# Patient Record
(demographics unavailable — no encounter records)

---

## 2024-10-28 NOTE — ADDENDUM
[FreeTextEntry1] : Documented by Karl Gautam acting as a scribe for Dr. Carmona and Joshua Mireles PA-C on 10/28/2024 and was presence for the following sections: Physical Exam; Data Reviewed; Assessment; Discussion/Summary. All medical record entries made by the Scribe were at my, Dr. Carmona, and Joshua Mireles, direction and personally dictated by me on 10/28/2024. I have reviewed the chart and agree that the record accurately reflects my personal performance of the history, physical exam, procedure and imaging.

## 2024-10-28 NOTE — HISTORY OF PRESENT ILLNESS
[de-identified] : The patient is a 30 year old [RIGHT] hand dominant male who presents today complaining of right knee.   Date of Injury/Onset: 1 month ago  Pain:    At Rest: 0/10 With Activity:  5/10  Mechanism of injury: Pt reports hyperextending the kneel while playing beach volleyball  This is NOT a Work Related Injury being treated under Worker's Compensation. This is NOT an athletic injury occurring associated with an interscholastic or organized sports team. Quality of symptoms: sharp, stabbing, aching  Improves with: rest, ibuprofen, ice  Worse with: squatting Prior treatment: P/T 6  weeks ago. Admits it made symptoms worse. Soreness and lingering pain.  Prior Imaging: MRI 10/17/24 Out of work/sport: _, since _ School/Sport/Position/Occupation: Sales  Additional Information: None

## 2024-10-28 NOTE — PHYSICAL EXAM
[de-identified] : Neurologic: normal sensation, normal mood and affect, orientated and able to communicate  Right Knee: NO effusion, FULL ROM neurovascularly intact ligamentously stable Non-tender

## 2024-10-28 NOTE — DISCUSSION/SUMMARY
[de-identified] : Reviewed all MRI images with patient today and interpretation was provided. Patient advised to rest. Patient will follow up in 6 weeks.     ----------------------------------------------- Home Exercise The patient is instructed on a home exercise program.  WEN JACOME Acting as a Scribe for Dr. Kristine RACHEL, Wen Jacome, attest that this documentation has been prepared under the direction and in the presence of Provider Dr. Carmona.  Activity Modification The patient was advised to modify their activities.  Dx / Natural History The patient was advised of the diagnosis. The natural history of the pathology was explained in full to the patient in layman's terms. Several different treatment options were discussed and explained in full to the patient including the risks and benefits of both surgical and non-surgical treatments.  All questions and concerns were answered.  Pain Guide Activities The patient was advised to let pain guide the gradual advancement of activities.  RICE I explained to the patient that rest, ice, compression, and elevation would benefit them. They may return to activity after follow-up or when they no longer have any pain.  The patient's current medication management of their orthopedic diagnosis was reviewed today: (1) We discussed a comprehensive treatment plan that included possible pharmaceutical management involving the use of prescription strength medications including but not limited to options such as oral Naprosyn 500mg BID, once daily Meloxicam 15 mg, or 500-650 mg Tylenol versus over the counter oral medications and topical prescription NSAID Pennsaid vs over the counter Voltaren gel. (2) There is a moderate risk of morbidity with further treatment, especially from use of prescription strength medications and possible side effects of these medications which include upset stomach with oral medications, skin reactions to topical medications and cardiac/renal issues with long term use. (3) I recommended that the patient follow-up with their medical physician to discuss any significant specific potential issues with long term medication use such as interactions with current medications or with exacerbation of underlying medical comorbidities. (4) The benefits and risks associated with use of injectable, oral or topical, prescription and over the counter anti-inflammatory medications were discussed with the patient. The patient voiced understanding of the risks including but not limited to bleeding, stroke, kidney dysfunction, heart disease, and were referred to the black box warning label for further information.

## 2024-10-28 NOTE — DATA REVIEWED
[FreeTextEntry1] : 08/26/24 X-Ray Examination of the RIGHT KNEE: 4 views: Patella Stacey, otherwise unremarkable  10/17/24 [facility] Right Knee: This scan was reviewed and interpreted by Dr. Carmona, and his findings are- Impression:  1. Possible diffuse stress reaction or contusion throughout the patella and findings consistent with mild partial tearing and delamination at the central quadriceps tendon insertion with mild surrounding bursitis without discontinuity or retraction. 2. Mild patella tendinopathy. 3. Slight effusion. 4. Mild subcortical cysts in the posterior superior lateral femoral condyle which appear degenerative or inflammatory and may be related to enthesopathic changes of the proximal lateral gastrocnemius tendon insertion. 5. No meniscal tear. chondral defect in the patella or loose body.

## 2024-12-10 NOTE — DISCUSSION/SUMMARY
[de-identified] : Patient is still experiencing pain. Discussed all treatment options with patient, patient is considering PRP injections for both knees Physical therapy prescribed for strengthening and stretching.--- bilateral distal quad tear MRI of the left knee to eval for distal quad tear. Follow up for prp injection -right knee.  PT at [facility]    ----------------------------------------------- Home Exercise The patient is instructed on a home exercise program.  NAMRATA TSE Acting as a Scribe for Dr. Kristine RACHEL, Namrata Tse, attest that this documentation has been prepared under the direction and in the presence of Provider David Carmona MD.  Activity Modification The patient was advised to modify their activities.  Dx / Natural History The patient was advised of the diagnosis.  The natural history of the pathology was explained in full to the patient in layman's terms.  Several different treatment options were discussed and explained in full to the patient including the risks and benefits of both surgical and non-surgical treatments.  All questions and concerns were answered.  Pain Guide Activities The patient was advised to let pain guide the gradual advancement of activities.  BERTA RACHEL explained to the patient that rest, ice, compression, and elevation would benefit them.  They may return to activity after follow-up or when they no longer have any pain.  The patient's current medication management of their orthopedic diagnosis was reviewed today: (1) We discussed a comprehensive treatment plan that included possible pharmaceutical management involving the use of prescription strength medications including but not limited to options such as oral Naprosyn 500mg BID, once daily Meloxicam 15 mg, or 500-650 mg Tylenol versus over the counter oral medications and topical prescription NSAID Pennsaid vs over the counter Voltaren gel. (2) There is a moderate risk of morbidity with further treatment, especially from use of prescription strength medications and possible side effects of these medications which include upset stomach with oral medications, skin reactions to topical medications and cardiac/renal issues with long term use. (3) I recommended that the patient follow-up with their medical physician to discuss any significant specific potential issues with long term medication use such as interactions with current medications or with exacerbation of underlying medical comorbidities. (4) The benefits and risks associated with use of injectable, oral or topical, prescription and over the counter anti-inflammatory medications were discussed with the patient. The patient voiced understanding of the risks including but not limited to bleeding, stroke, kidney dysfunction, heart disease, and were referred to the black box warning label for further information.

## 2024-12-10 NOTE — DISCUSSION/SUMMARY
[de-identified] : Patient is still experiencing pain. Discussed all treatment options with patient, patient is considering PRP injections for both knees Physical therapy prescribed for strengthening and stretching.--- bilateral distal quad tear MRI of the left knee to eval for distal quad tear. Follow up for prp injection -right knee.  PT at [facility]    ----------------------------------------------- Home Exercise The patient is instructed on a home exercise program.  NAMRATA TSE Acting as a Scribe for Dr. Kristine RACHEL, Namrata Tse, attest that this documentation has been prepared under the direction and in the presence of Provider David Carmona MD.  Activity Modification The patient was advised to modify their activities.  Dx / Natural History The patient was advised of the diagnosis.  The natural history of the pathology was explained in full to the patient in layman's terms.  Several different treatment options were discussed and explained in full to the patient including the risks and benefits of both surgical and non-surgical treatments.  All questions and concerns were answered.  Pain Guide Activities The patient was advised to let pain guide the gradual advancement of activities.  BERTA RACHEL explained to the patient that rest, ice, compression, and elevation would benefit them.  They may return to activity after follow-up or when they no longer have any pain.  The patient's current medication management of their orthopedic diagnosis was reviewed today: (1) We discussed a comprehensive treatment plan that included possible pharmaceutical management involving the use of prescription strength medications including but not limited to options such as oral Naprosyn 500mg BID, once daily Meloxicam 15 mg, or 500-650 mg Tylenol versus over the counter oral medications and topical prescription NSAID Pennsaid vs over the counter Voltaren gel. (2) There is a moderate risk of morbidity with further treatment, especially from use of prescription strength medications and possible side effects of these medications which include upset stomach with oral medications, skin reactions to topical medications and cardiac/renal issues with long term use. (3) I recommended that the patient follow-up with their medical physician to discuss any significant specific potential issues with long term medication use such as interactions with current medications or with exacerbation of underlying medical comorbidities. (4) The benefits and risks associated with use of injectable, oral or topical, prescription and over the counter anti-inflammatory medications were discussed with the patient. The patient voiced understanding of the risks including but not limited to bleeding, stroke, kidney dysfunction, heart disease, and were referred to the black box warning label for further information.

## 2024-12-10 NOTE — PHYSICAL EXAM
[de-identified] : Neurologic: normal sensation, normal mood and affect, orientated and able to communicate  Bill knee: full rom with pain distal quad tenderness

## 2024-12-10 NOTE — HISTORY OF PRESENT ILLNESS
[de-identified] : The patient is a 30 year old [RIGHT] hand dominant male who presents today complaining of right knee.  Also states his left knee has pain , he admits it feels the same as the right knee.  Date of Injury/Onset: 1 month ago  Pain:    At Rest: 1/10 With Activity:  7/10  Mechanism of injury: Pt reports hyperextending the kneel while playing beach volleyball , his left knee pain is insidious.  This is NOT a Work Related Injury being treated under Worker's Compensation. This is NOT an athletic injury occurring associated with an interscholastic or organized sports team. Quality of symptoms: sharp, stabbing, aching  Improves with: rest, ibuprofen, ice  Worse with: squatting Treatment/Imaging/Studies Since Last Visit: MRI and PT  	Reports Available For Review Today: MRI @ [facility] 10/17/24 Change since last visit: left knee pain,  Out of work/sport: currently working  School/Sport/Position/Occupation: Sales  Additional Information: Patient states his left knee has the same pain as the right knee. He is interested in doing the PRP procedure.

## 2024-12-10 NOTE — HISTORY OF PRESENT ILLNESS
[de-identified] : The patient is a 30 year old [RIGHT] hand dominant male who presents today complaining of right knee.  Also states his left knee has pain , he admits it feels the same as the right knee.  Date of Injury/Onset: 1 month ago  Pain:    At Rest: 1/10 With Activity:  7/10  Mechanism of injury: Pt reports hyperextending the kneel while playing beach volleyball , his left knee pain is insidious.  This is NOT a Work Related Injury being treated under Worker's Compensation. This is NOT an athletic injury occurring associated with an interscholastic or organized sports team. Quality of symptoms: sharp, stabbing, aching  Improves with: rest, ibuprofen, ice  Worse with: squatting Treatment/Imaging/Studies Since Last Visit: MRI and PT  	Reports Available For Review Today: MRI @ [facility] 10/17/24 Change since last visit: left knee pain,  Out of work/sport: currently working  School/Sport/Position/Occupation: Sales  Additional Information: Patient states his left knee has the same pain as the right knee. He is interested in doing the PRP procedure.

## 2024-12-10 NOTE — PHYSICAL EXAM
[de-identified] : Neurologic: normal sensation, normal mood and affect, orientated and able to communicate  Bill knee: full rom with pain distal quad tenderness

## 2024-12-17 NOTE — HISTORY OF PRESENT ILLNESS
[de-identified] : The patient is a 30 year old [RIGHT] hand dominant male who presents today complaining of right knee pain Date of Injury/Onset: 1 month ago  Pain:    At Rest: 1/10 With Activity:  7/10  Mechanism of injury: Pt reports hyperextending the kneel while playing beach volleyball , his left knee pain is insidious.  This is NOT a Work Related Injury being treated under Worker's Compensation. This is NOT an athletic injury occurring associated with an interscholastic or organized sports team. Quality of symptoms: sharp, stabbing, aching  Improves with: rest, ibuprofen, ice  Worse with: squatting Treatment/Imaging/Studies Since Last Visit: MRI and PT  	Reports Available For Review Today: MRI @ [facility] 10/17/24 Change since last visit: left knee pain,  Out of work/sport: currently working  School/Sport/Position/Occupation: Sales  Additional Information: PRESENTS TODAY FOR PRP INJECTION OF RT KNEE

## 2024-12-17 NOTE — HISTORY OF PRESENT ILLNESS
[de-identified] : The patient is a 30 year old [RIGHT] hand dominant male who presents today complaining of right knee pain Date of Injury/Onset: 1 month ago  Pain:    At Rest: 1/10 With Activity:  7/10  Mechanism of injury: Pt reports hyperextending the kneel while playing beach volleyball , his left knee pain is insidious.  This is NOT a Work Related Injury being treated under Worker's Compensation. This is NOT an athletic injury occurring associated with an interscholastic or organized sports team. Quality of symptoms: sharp, stabbing, aching  Improves with: rest, ibuprofen, ice  Worse with: squatting Treatment/Imaging/Studies Since Last Visit: MRI and PT  	Reports Available For Review Today: MRI @ [facility] 10/17/24 Change since last visit: left knee pain,  Out of work/sport: currently working  School/Sport/Position/Occupation: Sales  Additional Information: PRESENTS TODAY FOR PRP INJECTION OF RT KNEE

## 2024-12-17 NOTE — PHYSICAL EXAM
[de-identified] : Neurologic: normal sensation, normal mood and affect, orientated and able to communicate  Bill knee: full rom with pain distal quad tenderness

## 2024-12-17 NOTE — DISCUSSION/SUMMARY
[de-identified] : Patient is still experiencing pain.  Reviewed all MRI images with patient, interpretation was provided. Discussed all treatment options with patient, patient is considering PRP injections for both knees Rom Knee brace. Follow up next week   RB&A to PRP injection discussed. All questions were answered. Patient wishes to move forward with injection today.   Right Knee ultrasound guided (patella tendon) PRP injection  Platelet Rich Plasma (PRP) injection into the DISTAL QUADRICEPS TENDON. The risks, benefits, and alternatives to PRP injection were explained in full to the patient. Risks outlined include but are not limited to infection, sepsis, bleeding, scarring, skin discoloration, temporary increase in pain, syncopal episode, failure to resolve symptoms, allergic reaction, symptom recurrence, and inflammation. Patient understood the risks. All questions were answered. After discussion of options, patient requested an injection. Oral informed consent was obtained and sterile prep was done of the injection site. 20cc of blood was obtained and placed in the ACP centrifuge. 5cc of PRP was drawn off using the dual syringe. Oral informed consent was obtained and sterile prep was done of the injection site. Sterile technique was used to introduce the mixture into the DISTAL QUADRICEPS TENDON under ultrasound guidance. Patient tolerated the procedure well. Advised to ice the injection site this evening.   ----------------------------------------------- Home Exercise The patient is instructed on a home exercise program.  NAMRATA TSE Acting as a Scribe for Dr. Kristine RACHEL, Namrata Tse, attest that this documentation has been prepared under the direction and in the presence of Provider David Carmona MD.  Activity Modification The patient was advised to modify their activities.  Dx / Natural History The patient was advised of the diagnosis.  The natural history of the pathology was explained in full to the patient in layman's terms.  Several different treatment options were discussed and explained in full to the patient including the risks and benefits of both surgical and non-surgical treatments.  All questions and concerns were answered.  Pain Guide Activities The patient was advised to let pain guide the gradual advancement of activities.  BERTA RACHEL explained to the patient that rest, ice, compression, and elevation would benefit them.  They may return to activity after follow-up or when they no longer have any pain.  The patient's current medication management of their orthopedic diagnosis was reviewed today: (1) We discussed a comprehensive treatment plan that included possible pharmaceutical management involving the use of prescription strength medications including but not limited to options such as oral Naprosyn 500mg BID, once daily Meloxicam 15 mg, or 500-650 mg Tylenol versus over the counter oral medications and topical prescription NSAID Pennsaid vs over the counter Voltaren gel. (2) There is a moderate risk of morbidity with further treatment, especially from use of prescription strength medications and possible side effects of these medications which include upset stomach with oral medications, skin reactions to topical medications and cardiac/renal issues with long term use. (3) I recommended that the patient follow-up with their medical physician to discuss any significant specific potential issues with long term medication use such as interactions with current medications or with exacerbation of underlying medical comorbidities. (4) The benefits and risks associated with use of injectable, oral or topical, prescription and over the counter anti-inflammatory medications were discussed with the patient. The patient voiced understanding of the risks including but not limited to bleeding, stroke, kidney dysfunction, heart disease, and were referred to the black box warning label for further information.

## 2024-12-17 NOTE — PHYSICAL EXAM
[de-identified] : Neurologic: normal sensation, normal mood and affect, orientated and able to communicate  Bill knee: full rom with pain distal quad tenderness

## 2024-12-17 NOTE — DATA REVIEWED
[FreeTextEntry1] : 08/26/24 X-Ray Examination of the RIGHT KNEE: 4 views: Patella Stacey, otherwise unremarkable  10/17/24 [facility] MRI Right Knee: This scan was reviewed and interpreted by Dr. Carmona, and his findings are- Impression:  1. Possible diffuse stress reaction or contusion throughout the patella and findings consistent with mild partial tearing and delamination at the central quadriceps tendon insertion with mild surrounding bursitis without discontinuity or retraction. 2. Mild patella tendinopathy. 3. Slight effusion. 4. Mild subcortical cysts in the posterior superior lateral femoral condyle which appear degenerative or inflammatory and may be related to enthesopathic changes of the proximal lateral gastrocnemius tendon insertion. 5. No meniscal tear. chondral defect in the patella or loose body.   12/10/24 [facility] MRI Left Knee: 1. Distal quadriceps tendinitis without tear. 2. Slight patellofemoral effusion and synovitis. 3. No meniscal tear, ligament tear, chondral defect, acute osseous injury or loose body.

## 2024-12-17 NOTE — DISCUSSION/SUMMARY
[de-identified] : Patient is still experiencing pain.  Reviewed all MRI images with patient, interpretation was provided. Discussed all treatment options with patient, patient is considering PRP injections for both knees Rom Knee brace. Follow up next week   RB&A to PRP injection discussed. All questions were answered. Patient wishes to move forward with injection today.   Right Knee ultrasound guided (patella tendon) PRP injection  Platelet Rich Plasma (PRP) injection into the DISTAL QUADRICEPS TENDON. The risks, benefits, and alternatives to PRP injection were explained in full to the patient. Risks outlined include but are not limited to infection, sepsis, bleeding, scarring, skin discoloration, temporary increase in pain, syncopal episode, failure to resolve symptoms, allergic reaction, symptom recurrence, and inflammation. Patient understood the risks. All questions were answered. After discussion of options, patient requested an injection. Oral informed consent was obtained and sterile prep was done of the injection site. 20cc of blood was obtained and placed in the ACP centrifuge. 5cc of PRP was drawn off using the dual syringe. Oral informed consent was obtained and sterile prep was done of the injection site. Sterile technique was used to introduce the mixture into the DISTAL QUADRICEPS TENDON under ultrasound guidance. Patient tolerated the procedure well. Advised to ice the injection site this evening.   ----------------------------------------------- Home Exercise The patient is instructed on a home exercise program.  NAMRATA TSE Acting as a Scribe for Dr. Kristine RACHEL, Namrata Tse, attest that this documentation has been prepared under the direction and in the presence of Provider David Carmona MD.  Activity Modification The patient was advised to modify their activities.  Dx / Natural History The patient was advised of the diagnosis.  The natural history of the pathology was explained in full to the patient in layman's terms.  Several different treatment options were discussed and explained in full to the patient including the risks and benefits of both surgical and non-surgical treatments.  All questions and concerns were answered.  Pain Guide Activities The patient was advised to let pain guide the gradual advancement of activities.  BERTA RACHEL explained to the patient that rest, ice, compression, and elevation would benefit them.  They may return to activity after follow-up or when they no longer have any pain.  The patient's current medication management of their orthopedic diagnosis was reviewed today: (1) We discussed a comprehensive treatment plan that included possible pharmaceutical management involving the use of prescription strength medications including but not limited to options such as oral Naprosyn 500mg BID, once daily Meloxicam 15 mg, or 500-650 mg Tylenol versus over the counter oral medications and topical prescription NSAID Pennsaid vs over the counter Voltaren gel. (2) There is a moderate risk of morbidity with further treatment, especially from use of prescription strength medications and possible side effects of these medications which include upset stomach with oral medications, skin reactions to topical medications and cardiac/renal issues with long term use. (3) I recommended that the patient follow-up with their medical physician to discuss any significant specific potential issues with long term medication use such as interactions with current medications or with exacerbation of underlying medical comorbidities. (4) The benefits and risks associated with use of injectable, oral or topical, prescription and over the counter anti-inflammatory medications were discussed with the patient. The patient voiced understanding of the risks including but not limited to bleeding, stroke, kidney dysfunction, heart disease, and were referred to the black box warning label for further information.

## 2024-12-23 NOTE — HISTORY OF PRESENT ILLNESS
[de-identified] : The patient is a 30 year old [RIGHT] hand dominant male who presents today complaining of right knee pain Date of Injury/Onset: 1 month ago  Pain:    At Rest: 1/10 With Activity:  7/10  Mechanism of injury: Pt reports hyperextending the kneel while playing beach volleyball , his left knee pain is insidious.  This is NOT a Work Related Injury being treated under Worker's Compensation. This is NOT an athletic injury occurring associated with an interscholastic or organized sports team. Quality of symptoms: sharp, stabbing, aching  Improves with: rest, ibuprofen, ice  Worse with: squatting Treatment/Imaging/Studies Since Last Visit: MRI and PT  	Reports Available For Review Today: MRI @ [facility] 10/17/24 Change since last visit:  Out of work/sport: currently working  School/Sport/Position/Occupation: Sales  Additional Information: PRESENTS TODAY FOR PRP INJECTION OF RT KNEE

## 2024-12-23 NOTE — PHYSICAL EXAM
[de-identified] : Neurologic: normal sensation, normal mood and affect, orientated and able to communicate  Bill knee: full rom with pain distal quad tenderness

## 2024-12-23 NOTE — DISCUSSION/SUMMARY
[de-identified] : Patient is still experiencing pain. Discussed all treatment options with patient, patient is considering PRP injections for both knees Rom Knee brace. Patient will follow up as needed.  RB&A to PRP injection discussed. All questions were answered. Patient wishes to move forward with injection today.   Right Knee ultrasound guided (patella tendon) PRP injection  Platelet Rich Plasma (PRP) injection into the DISTAL QUADRICEPS TENDON. The risks, benefits, and alternatives to PRP injection were explained in full to the patient. Risks outlined include but are not limited to infection, sepsis, bleeding, scarring, skin discoloration, temporary increase in pain, syncopal episode, failure to resolve symptoms, allergic reaction, symptom recurrence, and inflammation. Patient understood the risks. All questions were answered. After discussion of options, patient requested an injection. Oral informed consent was obtained and sterile prep was done of the injection site. 20cc of blood was obtained and placed in the ACP centrifuge. 5cc of PRP was drawn off using the dual syringe. Oral informed consent was obtained and sterile prep was done of the injection site. Sterile technique was used to introduce the mixture into the DISTAL QUADRICEPS TENDON under ultrasound guidance. Patient tolerated the procedure well. Advised to ice the injection site this evening.   ----------------------------------------------- Home Exercise The patient is instructed on a home exercise program.  NAMRATA TSE Acting as a Scribe for Dr. Kristine RACHEL, Namrata Tse, attest that this documentation has been prepared under the direction and in the presence of Provider David Carmona MD.  Activity Modification The patient was advised to modify their activities.  Dx / Natural History The patient was advised of the diagnosis.  The natural history of the pathology was explained in full to the patient in layman's terms.  Several different treatment options were discussed and explained in full to the patient including the risks and benefits of both surgical and non-surgical treatments.  All questions and concerns were answered.  Pain Guide Activities The patient was advised to let pain guide the gradual advancement of activities.  BERTA RACHEL explained to the patient that rest, ice, compression, and elevation would benefit them.  They may return to activity after follow-up or when they no longer have any pain.  The patient's current medication management of their orthopedic diagnosis was reviewed today: (1) We discussed a comprehensive treatment plan that included possible pharmaceutical management involving the use of prescription strength medications including but not limited to options such as oral Naprosyn 500mg BID, once daily Meloxicam 15 mg, or 500-650 mg Tylenol versus over the counter oral medications and topical prescription NSAID Pennsaid vs over the counter Voltaren gel. (2) There is a moderate risk of morbidity with further treatment, especially from use of prescription strength medications and possible side effects of these medications which include upset stomach with oral medications, skin reactions to topical medications and cardiac/renal issues with long term use. (3) I recommended that the patient follow-up with their medical physician to discuss any significant specific potential issues with long term medication use such as interactions with current medications or with exacerbation of underlying medical comorbidities. (4) The benefits and risks associated with use of injectable, oral or topical, prescription and over the counter anti-inflammatory medications were discussed with the patient. The patient voiced understanding of the risks including but not limited to bleeding, stroke, kidney dysfunction, heart disease, and were referred to the black box warning label for further information.

## 2025-02-25 NOTE — ADDENDUM
[FreeTextEntry1] : Documented by Karl Gautam acting as a scribe for Dr. Carmona and Joshua Mireles PA-C on 02/25/2025 and was presence for the following sections: Physical Exam; Data Reviewed; Assessment; Discussion/Summary. All medical record entries made by the Scribe were at my, Dr. Carmona, and Joshua Mireles, direction and personally dictated by me on 02/25/2025. I have reviewed the chart and agree that the record accurately reflects my personal performance of the history, physical exam, procedure and imaging.

## 2025-02-25 NOTE — PHYSICAL EXAM
[de-identified] : Neurologic: normal mood and affect, orientated and able to communicate Constitutional: well developed and well nourished  Bill knee: full rom with pain distal quad tenderness

## 2025-02-25 NOTE — DISCUSSION/SUMMARY
[de-identified] : Patient will continue physical therapy for strengthening and stretching. Patient will follow up in 6 weeks.     ----------------------------------------------- Home Exercise The patient is instructed on a home exercise program.  WEN JACOME Acting as a Scribe for Dr. Kristine RACHEL, Wen Jacome, attest that this documentation has been prepared under the direction and in the presence of Provider Dr. Carmona.  Activity Modification The patient was advised to modify their activities.  Dx / Natural History The patient was advised of the diagnosis. The natural history of the pathology was explained in full to the patient in layman's terms. Several different treatment options were discussed and explained in full to the patient including the risks and benefits of both surgical and non-surgical treatments.  All questions and concerns were answered.  Pain Guide Activities The patient was advised to let pain guide the gradual advancement of activities.  RICE I explained to the patient that rest, ice, compression, and elevation would benefit them. They may return to activity after follow-up or when they no longer have any pain.  The patient's current medication management of their orthopedic diagnosis was reviewed today: (1) We discussed a comprehensive treatment plan that included possible pharmaceutical management involving the use of prescription strength medications including but not limited to options such as oral Naprosyn 500mg BID, once daily Meloxicam 15 mg, or 500-650 mg Tylenol versus over the counter oral medications and topical prescription NSAID Pennsaid vs over the counter Voltaren gel. (2) There is a moderate risk of morbidity with further treatment, especially from use of prescription strength medications and possible side effects of these medications which include upset stomach with oral medications, skin reactions to topical medications and cardiac/renal issues with long term use. (3) I recommended that the patient follow-up with their medical physician to discuss any significant specific potential issues with long term medication use such as interactions with current medications or with exacerbation of underlying medical comorbidities. (4) The benefits and risks associated with use of injectable, oral or topical, prescription and over the counter anti-inflammatory medications were discussed with the patient. The patient voiced understanding of the risks including but not limited to bleeding, stroke, kidney dysfunction, heart disease, and were referred to the black box warning label for further information.

## 2025-02-25 NOTE — DISCUSSION/SUMMARY
[de-identified] : Patient will continue physical therapy for strengthening and stretching. Patient will follow up in 6 weeks.     ----------------------------------------------- Home Exercise The patient is instructed on a home exercise program.  WEN JCAOME Acting as a Scribe for Dr. Kristine RACHEL, Wen Jacome, attest that this documentation has been prepared under the direction and in the presence of Provider Dr. Carmona.  Activity Modification The patient was advised to modify their activities.  Dx / Natural History The patient was advised of the diagnosis. The natural history of the pathology was explained in full to the patient in layman's terms. Several different treatment options were discussed and explained in full to the patient including the risks and benefits of both surgical and non-surgical treatments.  All questions and concerns were answered.  Pain Guide Activities The patient was advised to let pain guide the gradual advancement of activities.  RICE I explained to the patient that rest, ice, compression, and elevation would benefit them. They may return to activity after follow-up or when they no longer have any pain.  The patient's current medication management of their orthopedic diagnosis was reviewed today: (1) We discussed a comprehensive treatment plan that included possible pharmaceutical management involving the use of prescription strength medications including but not limited to options such as oral Naprosyn 500mg BID, once daily Meloxicam 15 mg, or 500-650 mg Tylenol versus over the counter oral medications and topical prescription NSAID Pennsaid vs over the counter Voltaren gel. (2) There is a moderate risk of morbidity with further treatment, especially from use of prescription strength medications and possible side effects of these medications which include upset stomach with oral medications, skin reactions to topical medications and cardiac/renal issues with long term use. (3) I recommended that the patient follow-up with their medical physician to discuss any significant specific potential issues with long term medication use such as interactions with current medications or with exacerbation of underlying medical comorbidities. (4) The benefits and risks associated with use of injectable, oral or topical, prescription and over the counter anti-inflammatory medications were discussed with the patient. The patient voiced understanding of the risks including but not limited to bleeding, stroke, kidney dysfunction, heart disease, and were referred to the black box warning label for further information.

## 2025-02-25 NOTE — HISTORY OF PRESENT ILLNESS
[de-identified] : The patient is a 30 year old [RIGHT] hand dominant male who presents today complaining of right knee pain Date of Injury/Onset: 1 month ago  Pain:    At Rest: 1/10 With Activity:  3/10  Mechanism of injury: Pt reports hyperextending the kneel while playing beach volleyball , his left knee pain is insidious.  This is NOT a Work Related Injury being treated under Worker's Compensation. This is NOT an athletic injury occurring associated with an interscholastic or organized sports team. Quality of symptoms: sharp, stabbing, aching  Improves with: rest, ibuprofen, ice  Worse with: squatting Treatment/Imaging/Studies Since Last Visit: PRP injection and PT @ [facility] in [place]- working on functional strength and jogging 	Reports Available For Review Today: MRI @ [facility] 10/17/24 Change since last visit: Patient reports dec in pain since last visit but not 100%. Reports soreness with jogging that resolves once he stops, but then report unable to jog for a few days after. Reports difficulty with eccentric lifts like step back lunge. Out of work/sport: currently working  School/Sport/Position/Occupation: Sales  Additional Information:

## 2025-02-25 NOTE — PHYSICAL EXAM
[de-identified] : Neurologic: normal mood and affect, orientated and able to communicate Constitutional: well developed and well nourished  Bill knee: full rom with pain distal quad tenderness

## 2025-02-25 NOTE — HISTORY OF PRESENT ILLNESS
[de-identified] : The patient is a 30 year old [RIGHT] hand dominant male who presents today complaining of right knee pain Date of Injury/Onset: 1 month ago  Pain:    At Rest: 1/10 With Activity:  3/10  Mechanism of injury: Pt reports hyperextending the kneel while playing beach volleyball , his left knee pain is insidious.  This is NOT a Work Related Injury being treated under Worker's Compensation. This is NOT an athletic injury occurring associated with an interscholastic or organized sports team. Quality of symptoms: sharp, stabbing, aching  Improves with: rest, ibuprofen, ice  Worse with: squatting Treatment/Imaging/Studies Since Last Visit: PRP injection and PT @ [facility] in [place]- working on functional strength and jogging 	Reports Available For Review Today: MRI @ [facility] 10/17/24 Change since last visit: Patient reports dec in pain since last visit but not 100%. Reports soreness with jogging that resolves once he stops, but then report unable to jog for a few days after. Reports difficulty with eccentric lifts like step back lunge. Out of work/sport: currently working  School/Sport/Position/Occupation: Sales  Additional Information: